# Patient Record
Sex: MALE | Race: BLACK OR AFRICAN AMERICAN | URBAN - METROPOLITAN AREA
[De-identification: names, ages, dates, MRNs, and addresses within clinical notes are randomized per-mention and may not be internally consistent; named-entity substitution may affect disease eponyms.]

---

## 2020-07-13 ENCOUNTER — OFFICE VISIT (OUTPATIENT)
Dept: URBAN - METROPOLITAN AREA CLINIC 92 | Facility: CLINIC | Age: 85
End: 2020-07-13

## 2021-09-14 ENCOUNTER — LAB OUTSIDE AN ENCOUNTER (OUTPATIENT)
Dept: URBAN - METROPOLITAN AREA CLINIC 92 | Facility: CLINIC | Age: 86
End: 2021-09-14

## 2021-09-14 ENCOUNTER — OFFICE VISIT (OUTPATIENT)
Dept: URBAN - METROPOLITAN AREA CLINIC 92 | Facility: CLINIC | Age: 86
End: 2021-09-14
Payer: MEDICARE

## 2021-09-14 DIAGNOSIS — K21.9 GASTROESOPHAGEAL REFLUX DISEASE, UNSPECIFIED WHETHER ESOPHAGITIS PRESENT: ICD-10-CM

## 2021-09-14 DIAGNOSIS — R10.13 EPIGASTRIC PAIN: ICD-10-CM

## 2021-09-14 PROCEDURE — 99213 OFFICE O/P EST LOW 20 MIN: CPT | Performed by: INTERNAL MEDICINE

## 2021-09-14 RX ORDER — OMEPRAZOLE 40 MG/1
1 CAPSULE 30 MINUTES BEFORE MORNING MEAL CAPSULE, DELAYED RELEASE ORAL ONCE A DAY
Qty: 30 | Refills: 2 | OUTPATIENT
Start: 2021-09-14

## 2021-09-14 NOTE — HPI-TODAY'S VISIT:
Patient seen for epigastric and right upper quadrant abdominal pain for the past 3 months.  Daily.  Sharp and crampy in nature and last several hours.  Increase with p.o. intake.  Also associated with significant GERD.  Previously GERD was treated with Zantac.  Denies any dysphagia, early satiety, nausea vomiting or weight loss.  No GI bleeding.  States EGD and colonoscopy 5 years ago was normal.  No NSAIDs.

## 2021-10-19 ENCOUNTER — OFFICE VISIT (OUTPATIENT)
Dept: URBAN - METROPOLITAN AREA CLINIC 92 | Facility: CLINIC | Age: 86
End: 2021-10-19

## 2021-10-26 ENCOUNTER — DASHBOARD ENCOUNTERS (OUTPATIENT)
Age: 86
End: 2021-10-26

## 2021-10-26 ENCOUNTER — OFFICE VISIT (OUTPATIENT)
Dept: URBAN - METROPOLITAN AREA CLINIC 92 | Facility: CLINIC | Age: 86
End: 2021-10-26
Payer: MEDICARE

## 2021-10-26 DIAGNOSIS — K21.9 GASTROESOPHAGEAL REFLUX DISEASE, UNSPECIFIED WHETHER ESOPHAGITIS PRESENT: ICD-10-CM

## 2021-10-26 PROBLEM — 235595009: Status: ACTIVE | Noted: 2021-10-26

## 2021-10-26 PROCEDURE — 99213 OFFICE O/P EST LOW 20 MIN: CPT | Performed by: INTERNAL MEDICINE

## 2021-10-26 RX ORDER — OMEPRAZOLE 40 MG/1
1 CAPSULE 30 MINUTES BEFORE MORNING MEAL CAPSULE, DELAYED RELEASE ORAL ONCE A DAY
Qty: 30 | Refills: 2 | Status: ON HOLD | COMMUNITY
Start: 2021-09-14

## 2021-10-26 NOTE — HPI-TODAY'S VISIT:
Patient seen a few months ago for epigastric and right upper quadrant abdominal pain..  Daily.  Sharp and crampy in nature and last several hours.  Increase with p.o. intake.  Also associated with significant GERD.  Previously GERD was treated with Zantac.  Denies any dysphagia, early satiety, nausea vomiting or weight loss.  No GI bleeding.  Since last visist given PPI and scheduled for EGD/RUQ u/s but did not completed. Is on PPI daily and now asymptomatic. No new symptoms.   States EGD and colonoscopy 5 years ago was normal.

## 2025-04-17 ENCOUNTER — CLAIMS CREATED FROM THE CLAIM WINDOW (OUTPATIENT)
Dept: URBAN - METROPOLITAN AREA MEDICAL CENTER 12 | Facility: MEDICAL CENTER | Age: OVER 89
End: 2025-04-17
Payer: MEDICARE

## 2025-04-17 DIAGNOSIS — I48.91 ATRIAL FIBRILLATION, UNSPECIFIED TYPE: ICD-10-CM

## 2025-04-17 DIAGNOSIS — D53.8 OTHER SPECIFIED NUTRITIONAL ANEMIAS: ICD-10-CM

## 2025-04-17 DIAGNOSIS — K92.1 ACUTE MELENA: ICD-10-CM

## 2025-04-17 DIAGNOSIS — Z79.01 ADEQUATE ANTICOAGULATION ON ANTICOAGULANT THERAPY: ICD-10-CM

## 2025-04-17 PROCEDURE — G8427 DOCREV CUR MEDS BY ELIG CLIN: HCPCS | Performed by: INTERNAL MEDICINE

## 2025-04-17 PROCEDURE — 99254 IP/OBS CNSLTJ NEW/EST MOD 60: CPT | Performed by: INTERNAL MEDICINE

## 2025-04-17 PROCEDURE — 99222 1ST HOSP IP/OBS MODERATE 55: CPT | Performed by: INTERNAL MEDICINE

## 2025-04-18 ENCOUNTER — CLAIMS CREATED FROM THE CLAIM WINDOW (OUTPATIENT)
Dept: URBAN - METROPOLITAN AREA MEDICAL CENTER 12 | Facility: MEDICAL CENTER | Age: OVER 89
End: 2025-04-18
Payer: MEDICARE

## 2025-04-18 DIAGNOSIS — Z79.01 ADEQUATE ANTICOAGULATION ON ANTICOAGULANT THERAPY: ICD-10-CM

## 2025-04-18 DIAGNOSIS — K92.1 ACUTE MELENA: ICD-10-CM

## 2025-04-18 DIAGNOSIS — I48.91 ATRIAL FIBRILLATION, UNSPECIFIED TYPE: ICD-10-CM

## 2025-04-18 DIAGNOSIS — D53.8 OTHER SPECIFIED NUTRITIONAL ANEMIAS: ICD-10-CM

## 2025-04-18 PROCEDURE — 99232 SBSQ HOSP IP/OBS MODERATE 35: CPT | Performed by: STUDENT IN AN ORGANIZED HEALTH CARE EDUCATION/TRAINING PROGRAM

## 2025-04-19 ENCOUNTER — CLAIMS CREATED FROM THE CLAIM WINDOW (OUTPATIENT)
Dept: URBAN - METROPOLITAN AREA MEDICAL CENTER 12 | Facility: MEDICAL CENTER | Age: OVER 89
End: 2025-04-19
Payer: MEDICARE

## 2025-04-19 DIAGNOSIS — D53.8 OTHER SPECIFIED NUTRITIONAL ANEMIAS: ICD-10-CM

## 2025-04-19 DIAGNOSIS — Z79.01 ADEQUATE ANTICOAGULATION ON ANTICOAGULANT THERAPY: ICD-10-CM

## 2025-04-19 DIAGNOSIS — I48.91 ATRIAL FIBRILLATION, UNSPECIFIED TYPE: ICD-10-CM

## 2025-04-19 DIAGNOSIS — K92.1 ACUTE MELENA: ICD-10-CM

## 2025-04-19 PROCEDURE — 99232 SBSQ HOSP IP/OBS MODERATE 35: CPT | Performed by: STUDENT IN AN ORGANIZED HEALTH CARE EDUCATION/TRAINING PROGRAM

## 2025-04-20 ENCOUNTER — CLAIMS CREATED FROM THE CLAIM WINDOW (OUTPATIENT)
Dept: URBAN - METROPOLITAN AREA MEDICAL CENTER 12 | Facility: MEDICAL CENTER | Age: OVER 89
End: 2025-04-20
Payer: MEDICARE

## 2025-04-20 DIAGNOSIS — Z79.01 ADEQUATE ANTICOAGULATION ON ANTICOAGULANT THERAPY: ICD-10-CM

## 2025-04-20 DIAGNOSIS — K92.1 ACUTE MELENA: ICD-10-CM

## 2025-04-20 DIAGNOSIS — D53.8 OTHER SPECIFIED NUTRITIONAL ANEMIAS: ICD-10-CM

## 2025-04-20 DIAGNOSIS — I48.91 ATRIAL FIBRILLATION, UNSPECIFIED TYPE: ICD-10-CM

## 2025-04-20 PROCEDURE — 99232 SBSQ HOSP IP/OBS MODERATE 35: CPT | Performed by: STUDENT IN AN ORGANIZED HEALTH CARE EDUCATION/TRAINING PROGRAM

## 2025-04-21 ENCOUNTER — CLAIMS CREATED FROM THE CLAIM WINDOW (OUTPATIENT)
Dept: URBAN - METROPOLITAN AREA MEDICAL CENTER 12 | Facility: MEDICAL CENTER | Age: OVER 89
End: 2025-04-21

## 2025-04-21 PROCEDURE — 99232 SBSQ HOSP IP/OBS MODERATE 35: CPT | Performed by: INTERNAL MEDICINE

## 2025-04-22 ENCOUNTER — CLAIMS CREATED FROM THE CLAIM WINDOW (OUTPATIENT)
Dept: URBAN - METROPOLITAN AREA MEDICAL CENTER 12 | Facility: MEDICAL CENTER | Age: OVER 89
End: 2025-04-22

## 2025-04-22 PROCEDURE — 43235 EGD DIAGNOSTIC BRUSH WASH: CPT | Performed by: INTERNAL MEDICINE

## 2025-04-23 ENCOUNTER — CLAIMS CREATED FROM THE CLAIM WINDOW (OUTPATIENT)
Dept: URBAN - METROPOLITAN AREA MEDICAL CENTER 12 | Facility: MEDICAL CENTER | Age: OVER 89
End: 2025-04-23

## 2025-04-23 PROCEDURE — 45380 COLONOSCOPY AND BIOPSY: CPT | Performed by: INTERNAL MEDICINE

## 2025-04-24 ENCOUNTER — CLAIMS CREATED FROM THE CLAIM WINDOW (OUTPATIENT)
Dept: URBAN - METROPOLITAN AREA MEDICAL CENTER 12 | Facility: MEDICAL CENTER | Age: OVER 89
End: 2025-04-24

## 2025-04-24 PROCEDURE — 99232 SBSQ HOSP IP/OBS MODERATE 35: CPT | Performed by: INTERNAL MEDICINE
